# Patient Record
Sex: FEMALE | Race: OTHER | URBAN - METROPOLITAN AREA
[De-identification: names, ages, dates, MRNs, and addresses within clinical notes are randomized per-mention and may not be internally consistent; named-entity substitution may affect disease eponyms.]

---

## 2020-07-07 ENCOUNTER — OFFICE VISIT (OUTPATIENT)
Dept: URBAN - METROPOLITAN AREA TELEHEALTH 2 | Facility: TELEHEALTH | Age: 70
End: 2020-07-07

## 2020-08-13 ENCOUNTER — OFFICE VISIT (OUTPATIENT)
Dept: URBAN - METROPOLITAN AREA TELEHEALTH 2 | Facility: TELEHEALTH | Age: 70
End: 2020-08-13
Payer: MEDICARE

## 2020-08-13 ENCOUNTER — LAB OUTSIDE AN ENCOUNTER (OUTPATIENT)
Dept: URBAN - METROPOLITAN AREA TELEHEALTH 2 | Facility: TELEHEALTH | Age: 70
End: 2020-08-13

## 2020-08-13 DIAGNOSIS — R13.19 CERVICAL DYSPHAGIA: ICD-10-CM

## 2020-08-13 DIAGNOSIS — K22.70 BARRETT'S ESOPHAGUS WITHOUT DYSPLASIA: ICD-10-CM

## 2020-08-13 DIAGNOSIS — R05 COUGH: ICD-10-CM

## 2020-08-13 DIAGNOSIS — K21.9 GERD: ICD-10-CM

## 2020-08-13 DIAGNOSIS — Z12.11 COLON CANCER SCREENING: ICD-10-CM

## 2020-08-13 PROCEDURE — 1036F TOBACCO NON-USER: CPT | Performed by: INTERNAL MEDICINE

## 2020-08-13 PROCEDURE — G8427 DOCREV CUR MEDS BY ELIG CLIN: HCPCS | Performed by: INTERNAL MEDICINE

## 2020-08-13 PROCEDURE — 99213 OFFICE O/P EST LOW 20 MIN: CPT | Performed by: INTERNAL MEDICINE

## 2020-08-13 PROCEDURE — 3017F COLORECTAL CA SCREEN DOC REV: CPT | Performed by: INTERNAL MEDICINE

## 2020-08-13 PROCEDURE — G8420 CALC BMI NORM PARAMETERS: HCPCS | Performed by: INTERNAL MEDICINE

## 2020-08-13 PROCEDURE — G9903 PT SCRN TBCO ID AS NON USER: HCPCS | Performed by: INTERNAL MEDICINE

## 2020-08-13 RX ORDER — OMEPRAZOLE 40 MG/1
1 CAPSULE 30 MINUTES BEFORE MORNING MEAL CAPSULE, DELAYED RELEASE ORAL
Qty: 90 | Refills: 3 | OUTPATIENT
Start: 2020-08-13

## 2020-08-13 NOTE — HPI-TODAY'S VISIT:
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  She underwent endoscopy on 8/7/2017 which showed esophagitis and Schatzki's ring she was dilated to 48 [place] biopsies did show metaplasia mild gastritis and duodenitis was also found.  She underwent screening colonoscopy on 8/7/2017 which was within normal limits.  Overall patient has been feeling well, with no significant swallowing issues she does complain of coughing up phlegm on a regular basis.  She has been taking over-the-counter omeprazole on a as needed basis.

## 2020-08-17 ENCOUNTER — OFFICE VISIT (OUTPATIENT)
Dept: URBAN - METROPOLITAN AREA SURGERY CENTER 30 | Facility: SURGERY CENTER | Age: 70
End: 2020-08-17
Payer: MEDICARE

## 2020-08-17 DIAGNOSIS — K29.30 CHRONIC SUPERFICIAL GASTRITIS: ICD-10-CM

## 2020-08-17 DIAGNOSIS — K22.70 BARRETT ESOPHAGUS: ICD-10-CM

## 2020-08-17 DIAGNOSIS — K22.2 ACQUIRED ESOPHAGEAL RING: ICD-10-CM

## 2020-08-17 PROCEDURE — G8907 PT DOC NO EVENTS ON DISCHARG: HCPCS | Performed by: INTERNAL MEDICINE

## 2020-08-17 PROCEDURE — 43450 DILATE ESOPHAGUS 1/MULT PASS: CPT | Performed by: INTERNAL MEDICINE

## 2020-08-17 PROCEDURE — 43239 EGD BIOPSY SINGLE/MULTIPLE: CPT | Performed by: INTERNAL MEDICINE

## 2020-09-01 ENCOUNTER — OFFICE VISIT (OUTPATIENT)
Dept: URBAN - METROPOLITAN AREA TELEHEALTH 2 | Facility: TELEHEALTH | Age: 70
End: 2020-09-01
Payer: MEDICARE

## 2020-09-01 DIAGNOSIS — K21.9 GERD: ICD-10-CM

## 2020-09-01 DIAGNOSIS — R13.19 OTHER DYSPHAGIA: ICD-10-CM

## 2020-09-01 DIAGNOSIS — Z12.11 COLON CANCER SCREENING: ICD-10-CM

## 2020-09-01 DIAGNOSIS — R05 COUGH: ICD-10-CM

## 2020-09-01 DIAGNOSIS — K22.70 BARRETT'S ESOPHAGUS WITHOUT DYSPLASIA: ICD-10-CM

## 2020-09-01 PROCEDURE — 3017F COLORECTAL CA SCREEN DOC REV: CPT | Performed by: INTERNAL MEDICINE

## 2020-09-01 PROCEDURE — 1036F TOBACCO NON-USER: CPT | Performed by: INTERNAL MEDICINE

## 2020-09-01 PROCEDURE — 99213 OFFICE O/P EST LOW 20 MIN: CPT | Performed by: INTERNAL MEDICINE

## 2020-09-01 PROCEDURE — G8420 CALC BMI NORM PARAMETERS: HCPCS | Performed by: INTERNAL MEDICINE

## 2020-09-01 PROCEDURE — G8427 DOCREV CUR MEDS BY ELIG CLIN: HCPCS | Performed by: INTERNAL MEDICINE

## 2020-09-01 PROCEDURE — G9903 PT SCRN TBCO ID AS NON USER: HCPCS | Performed by: INTERNAL MEDICINE

## 2020-09-01 RX ORDER — OMEPRAZOLE 40 MG/1
1 CAPSULE 30 MINUTES BEFORE MORNING MEAL CAPSULE, DELAYED RELEASE ORAL
Qty: 90 | Refills: 3 | Status: ACTIVE | COMMUNITY
Start: 2020-08-13

## 2020-09-01 RX ORDER — OMEPRAZOLE 40 MG/1
1 CAPSULE 30 MINUTES BEFORE MORNING MEAL CAPSULE, DELAYED RELEASE ORAL
Qty: 90 | Refills: 3 | OUTPATIENT

## 2020-09-01 NOTE — HPI-TODAY'S VISIT:
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  She underwent endoscopy on 8/7/2017 which showed esophagitis and Schatzki's ring she was dilated to 48 [place] biopsies did show metaplasia mild gastritis and duodenitis was also found.  She underwent screening colonoscopy on 8/7/2017 which was within normal limits.  Overall patient has been feeling well, with no significant swallowing issues she does complain of coughing up phlegm on a regular basis.  She has been taking over-the-counter omeprazole on a as needed basis. Patient underwent EGD on 8/11/2020 the endoscopy revealed esophagitis and a Schatzki's ring, which was dilated to 50 [place] .  Mild gastritis was found biopsies did show Argueta's with metaplasia.  Overall patient states she has not seen any real improvement in her swallowing, but she has had recent cervical spinal surgery which required placement of an endotracheal tube.  She has not yet begun her 40 mg daily omeprazole as prescribed.

## 2022-07-11 ENCOUNTER — LAB OUTSIDE AN ENCOUNTER (OUTPATIENT)
Dept: URBAN - METROPOLITAN AREA CLINIC 40 | Facility: CLINIC | Age: 72
End: 2022-07-11

## 2022-07-11 ENCOUNTER — WEB ENCOUNTER (OUTPATIENT)
Dept: URBAN - METROPOLITAN AREA CLINIC 40 | Facility: CLINIC | Age: 72
End: 2022-07-11

## 2022-07-11 ENCOUNTER — OFFICE VISIT (OUTPATIENT)
Dept: URBAN - METROPOLITAN AREA CLINIC 40 | Facility: CLINIC | Age: 72
End: 2022-07-11
Payer: MEDICARE

## 2022-07-11 VITALS
DIASTOLIC BLOOD PRESSURE: 74 MMHG | TEMPERATURE: 98.5 F | HEART RATE: 78 BPM | HEIGHT: 66 IN | BODY MASS INDEX: 21.53 KG/M2 | WEIGHT: 134 LBS | SYSTOLIC BLOOD PRESSURE: 115 MMHG

## 2022-07-11 DIAGNOSIS — K21.9 GERD: ICD-10-CM

## 2022-07-11 DIAGNOSIS — R13.10 ESOPHAGEAL DYSPHAGIA: ICD-10-CM

## 2022-07-11 DIAGNOSIS — K22.70 BARRETT'S ESOPHAGUS WITHOUT DYSPLASIA: ICD-10-CM

## 2022-07-11 PROCEDURE — 99214 OFFICE O/P EST MOD 30 MIN: CPT | Performed by: INTERNAL MEDICINE

## 2022-07-11 RX ORDER — OMEPRAZOLE 40 MG/1
1 CAPSULE 30 MINUTES BEFORE MORNING MEAL CAPSULE, DELAYED RELEASE ORAL
Qty: 90 | Refills: 3 | Status: ACTIVE | COMMUNITY

## 2022-07-11 RX ORDER — OMEPRAZOLE 40 MG/1
1 CAPSULE 30 MINUTES BEFORE MORNING MEAL CAPSULE, DELAYED RELEASE ORAL
Qty: 90 | Refills: 3 | OUTPATIENT

## 2022-07-11 NOTE — HPI-TODAY'S VISIT:
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  She underwent endoscopy on 8/7/2017 which showed esophagitis and Schatzki's ring she was dilated to 48 [place] biopsies did show metaplasia mild gastritis and duodenitis was also found.  She underwent screening colonoscopy on 8/7/2017 which was within normal limits.  Overall patient has been feeling well, with no significant swallowing issues she does complain of coughing up phlegm on a regular basis.  She has been taking over-the-counter omeprazole on a as needed basis. Patient underwent EGD on 8/11/2020 the endoscopy revealed esophagitis and a Schatzki's ring, which was dilated to 50 [place] .  Mild gastritis was found biopsies did show Argueta's with metaplasia.  Overall patient states she has not seen any real improvement in her swallowing, but she has had recent cervical spinal surgery which required placement of an endotracheal tube.  She has not yet begun her 40 mg daily omeprazole as prescribed. Patient underwent a normal screening colonoscopy on 8/7/2017, with an excellent prep. Patient returns for follow-up on 7/11/2022.  Overall she has been doing well from a GI standpoint.  She takes that daily omeprazole which is helped her reflux symptoms.  She does complain of a chronic cough, which she believes may be reflux related.  She is having some dysphagia, and states that in the past esophageal dilation did temporarily help.  She denies any abdominal pain and her bowel movements have been normal, on probiotics.  We discussed her history of Argueta's, and agreed that we should go ahead with surveillance upper endoscopy, with esophageal dilation.

## 2022-07-28 ENCOUNTER — OFFICE VISIT (OUTPATIENT)
Dept: URBAN - METROPOLITAN AREA SURGERY CENTER 30 | Facility: SURGERY CENTER | Age: 72
End: 2022-07-28

## 2022-08-08 ENCOUNTER — OFFICE VISIT (OUTPATIENT)
Dept: URBAN - METROPOLITAN AREA SURGERY CENTER 30 | Facility: SURGERY CENTER | Age: 72
End: 2022-08-08

## 2022-08-11 ENCOUNTER — OFFICE VISIT (OUTPATIENT)
Dept: URBAN - METROPOLITAN AREA CLINIC 40 | Facility: CLINIC | Age: 72
End: 2022-08-11

## 2022-08-15 ENCOUNTER — OFFICE VISIT (OUTPATIENT)
Dept: URBAN - METROPOLITAN AREA SURGERY CENTER 30 | Facility: SURGERY CENTER | Age: 72
End: 2022-08-15

## 2022-08-15 ENCOUNTER — TELEPHONE ENCOUNTER (OUTPATIENT)
Dept: URBAN - METROPOLITAN AREA CLINIC 74 | Facility: CLINIC | Age: 72
End: 2022-08-15

## 2022-08-29 ENCOUNTER — OFFICE VISIT (OUTPATIENT)
Dept: URBAN - METROPOLITAN AREA SURGERY CENTER 30 | Facility: SURGERY CENTER | Age: 72
End: 2022-08-29

## 2022-09-01 ENCOUNTER — OFFICE VISIT (OUTPATIENT)
Dept: URBAN - METROPOLITAN AREA SURGERY CENTER 30 | Facility: SURGERY CENTER | Age: 72
End: 2022-09-01
Payer: MEDICARE

## 2022-09-01 ENCOUNTER — CLAIMS CREATED FROM THE CLAIM WINDOW (OUTPATIENT)
Dept: URBAN - METROPOLITAN AREA CLINIC 4 | Facility: CLINIC | Age: 72
End: 2022-09-01
Payer: MEDICARE

## 2022-09-01 DIAGNOSIS — K31.89 ACQUIRED DEFORMITY OF DUODENUM: ICD-10-CM

## 2022-09-01 DIAGNOSIS — K22.70 BARRETT'S ESOPHAGUS WITHOUT DYSPLASIA: ICD-10-CM

## 2022-09-01 DIAGNOSIS — K31.89 OTHER DISEASES OF STOMACH AND DUODENUM: ICD-10-CM

## 2022-09-01 DIAGNOSIS — K22.719 BARRETT'S ESOPHAGUS WITH DYSPLASIA, UNSPECIFIED: ICD-10-CM

## 2022-09-01 PROCEDURE — 88312 SPECIAL STAINS GROUP 1: CPT | Performed by: PATHOLOGY

## 2022-09-01 PROCEDURE — 88313 SPECIAL STAINS GROUP 2: CPT | Performed by: PATHOLOGY

## 2022-09-01 PROCEDURE — 88305 TISSUE EXAM BY PATHOLOGIST: CPT | Performed by: PATHOLOGY

## 2022-09-01 PROCEDURE — 43239 EGD BIOPSY SINGLE/MULTIPLE: CPT | Performed by: INTERNAL MEDICINE

## 2022-09-01 PROCEDURE — G8907 PT DOC NO EVENTS ON DISCHARG: HCPCS | Performed by: INTERNAL MEDICINE

## 2022-09-01 RX ORDER — OMEPRAZOLE 40 MG/1
1 CAPSULE 30 MINUTES BEFORE MORNING MEAL CAPSULE, DELAYED RELEASE ORAL
Qty: 90 | Refills: 3 | OUTPATIENT

## 2022-09-27 ENCOUNTER — LAB OUTSIDE AN ENCOUNTER (OUTPATIENT)
Dept: URBAN - METROPOLITAN AREA CLINIC 40 | Facility: CLINIC | Age: 72
End: 2022-09-27

## 2022-09-27 ENCOUNTER — OFFICE VISIT (OUTPATIENT)
Dept: URBAN - METROPOLITAN AREA CLINIC 40 | Facility: CLINIC | Age: 72
End: 2022-09-27
Payer: MEDICARE

## 2022-09-27 VITALS
HEART RATE: 63 BPM | SYSTOLIC BLOOD PRESSURE: 118 MMHG | BODY MASS INDEX: 22.14 KG/M2 | DIASTOLIC BLOOD PRESSURE: 62 MMHG | TEMPERATURE: 97.7 F | WEIGHT: 137.8 LBS | OXYGEN SATURATION: 98 % | HEIGHT: 66 IN

## 2022-09-27 DIAGNOSIS — R10.30 LOWER ABDOMINAL PAIN: ICD-10-CM

## 2022-09-27 DIAGNOSIS — K22.70 BARRETT'S ESOPHAGUS WITHOUT DYSPLASIA: ICD-10-CM

## 2022-09-27 DIAGNOSIS — R93.89 ABNORMAL CT SCAN: ICD-10-CM

## 2022-09-27 DIAGNOSIS — K21.9 GERD: ICD-10-CM

## 2022-09-27 DIAGNOSIS — R19.7 DIARRHEA, UNSPECIFIED TYPE: ICD-10-CM

## 2022-09-27 DIAGNOSIS — R13.10 ESOPHAGEAL DYSPHAGIA: ICD-10-CM

## 2022-09-27 PROCEDURE — 99214 OFFICE O/P EST MOD 30 MIN: CPT | Performed by: INTERNAL MEDICINE

## 2022-09-27 RX ORDER — OMEPRAZOLE 40 MG/1
1 CAPSULE 30 MINUTES BEFORE MORNING MEAL CAPSULE, DELAYED RELEASE ORAL
Qty: 90 | Refills: 3 | OUTPATIENT

## 2022-09-27 RX ORDER — OMEPRAZOLE 40 MG/1
1 CAPSULE 30 MINUTES BEFORE MORNING MEAL CAPSULE, DELAYED RELEASE ORAL
Qty: 90 | Refills: 3 | Status: DISCONTINUED | COMMUNITY

## 2022-09-27 NOTE — HPI-TODAY'S VISIT:
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  She underwent endoscopy on 8/7/2017 which showed esophagitis and Schatzki's ring she was dilated to 48 [place] biopsies did show metaplasia mild gastritis and duodenitis was also found.  She underwent screening colonoscopy on 8/7/2017 which was within normal limits.  Overall patient has been feeling well, with no significant swallowing issues she does complain of coughing up phlegm on a regular basis.  She has been taking over-the-counter omeprazole on a as needed basis. Patient underwent EGD on 8/11/2020 the endoscopy revealed esophagitis and a Schatzki's ring, which was dilated to 50 [place] .  Mild gastritis was found biopsies did show Argueta's with metaplasia.  Overall patient states she has not seen any real improvement in her swallowing, but she has had recent cervical spinal surgery which required placement of an endotracheal tube.  She has not yet begun her 40 mg daily omeprazole as prescribed. Patient underwent a normal screening colonoscopy on 8/7/2017, with an excellent prep. Patient returns for follow-up on 7/11/2022.  Overall she has been doing well from a GI standpoint.  She takes that daily omeprazole which is helped her reflux symptoms.  She does complain of a chronic cough, which she believes may be reflux related.  She is having some dysphagia, and states that in the past esophageal dilation did temporarily help.  She denies any abdominal pain and her bowel movements have been normal, on probiotics.  We discussed her history of Argueta's, and agreed that we should go ahead with surveillance upper endoscopy, with esophageal dilation. Patient underwent EGD on 9/1/2022 there was an irregular Z-line which was biopsied and patchy mild gastritis was also seen, and with biopsy.  Gastric biopsies were unremarkable, distal esophageal biopsies did show focal metaplasia consistent with Argueta's esophagus. Patient underwent chest abdominal and pelvic CT scan on 8/2/2022 which showed possible mild inflammatory stranding along the colon consistent with a possible mild colitis. Patient returns for follow-up on 9/27/2022.  She has done well since her upper endoscopy, and has been taking natural supplements to treat her occasional heartburn and reflux.  She is having no swallowing issues.  I reviewed the results of her endoscopy which includes likely Argueta's esophagus with metaplasia.  Also mild gastritis was seen.  She is having no swallowing issues.  She denies any significant heartburn or reflux at this time, and no epigastric pain.  I reviewed the chest abdomen and pelvic CT scan done 8-22 which showed possible inflammatory stranding along the colon consistent with a possible mild colitis.  Patient does state that she will have intermittent episodes of significant lower abdominal pain accompanied by diarrhea.  She denies any overt GI bleeding but has had significant weight loss, about 25 pounds over this past year.  We therefore agreed to go ahead with a diagnostic colonoscopy, to rule out any significant colitis, including ischemic colitis.

## 2022-10-17 ENCOUNTER — OFFICE VISIT (OUTPATIENT)
Dept: URBAN - METROPOLITAN AREA SURGERY CENTER 30 | Facility: SURGERY CENTER | Age: 72
End: 2022-10-17

## 2022-10-20 ENCOUNTER — OFFICE VISIT (OUTPATIENT)
Dept: URBAN - METROPOLITAN AREA SURGERY CENTER 30 | Facility: SURGERY CENTER | Age: 72
End: 2022-10-20
Payer: MEDICARE

## 2022-10-20 DIAGNOSIS — R93.3 ABN FINDINGS-GI TRACT: ICD-10-CM

## 2022-10-20 DIAGNOSIS — R10.30 ABDOMINAL PAIN OF UNKNOWN CAUSE: ICD-10-CM

## 2022-10-20 PROCEDURE — G8907 PT DOC NO EVENTS ON DISCHARG: HCPCS | Performed by: INTERNAL MEDICINE

## 2022-10-20 PROCEDURE — 45378 DIAGNOSTIC COLONOSCOPY: CPT | Performed by: INTERNAL MEDICINE

## 2022-10-20 RX ORDER — OMEPRAZOLE 40 MG/1
1 CAPSULE 30 MINUTES BEFORE MORNING MEAL CAPSULE, DELAYED RELEASE ORAL
Qty: 90 | Refills: 3 | OUTPATIENT

## 2022-10-20 RX ORDER — OMEPRAZOLE 40 MG/1
1 CAPSULE 30 MINUTES BEFORE MORNING MEAL CAPSULE, DELAYED RELEASE ORAL
Qty: 90 | Refills: 3 | Status: ACTIVE | COMMUNITY

## 2022-11-15 ENCOUNTER — OFFICE VISIT (OUTPATIENT)
Dept: URBAN - METROPOLITAN AREA CLINIC 40 | Facility: CLINIC | Age: 72
End: 2022-11-15

## 2022-11-15 RX ORDER — OMEPRAZOLE 40 MG/1
1 CAPSULE 30 MINUTES BEFORE MORNING MEAL CAPSULE, DELAYED RELEASE ORAL
Qty: 90 | Refills: 3 | OUTPATIENT

## 2022-11-15 NOTE — HPI-TODAY'S VISIT:
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  She underwent endoscopy on 8/7/2017 which showed esophagitis and Schatzki's ring she was dilated to 48 [place] biopsies did show metaplasia mild gastritis and duodenitis was also found.  She underwent screening colonoscopy on 8/7/2017 which was within normal limits.  Overall patient has been feeling well, with no significant swallowing issues she does complain of coughing up phlegm on a regular basis.  She has been taking over-the-counter omeprazole on a as needed basis. Patient underwent EGD on 8/11/2020 the endoscopy revealed esophagitis and a Schatzki's ring, which was dilated to 50 [place] .  Mild gastritis was found biopsies did show Argueta's with metaplasia.  Overall patient states she has not seen any real improvement in her swallowing, but she has had recent cervical spinal surgery which required placement of an endotracheal tube.  She has not yet begun her 40 mg daily omeprazole as prescribed. Patient underwent a normal screening colonoscopy on 8/7/2017, with an excellent prep. Patient returns for follow-up on 7/11/2022.  Overall she has been doing well from a GI standpoint.  She takes that daily omeprazole which is helped her reflux symptoms.  She does complain of a chronic cough, which she believes may be reflux related.  She is having some dysphagia, and states that in the past esophageal dilation did temporarily help.  She denies any abdominal pain and her bowel movements have been normal, on probiotics.  We discussed her history of Argueta's, and agreed that we should go ahead with surveillance upper endoscopy, with esophageal dilation. Patient underwent EGD on 9/1/2022 there was an irregular Z-line which was biopsied and patchy mild gastritis was also seen, and with biopsy.  Gastric biopsies were unremarkable, distal esophageal biopsies did show focal metaplasia consistent with Argueta's esophagus. Patient underwent chest abdominal and pelvic CT scan on 8/2/2022 which showed possible mild inflammatory stranding along the colon consistent with a possible mild colitis. Patient returns for follow-up on 9/27/2022.  She has done well since her upper endoscopy, and has been taking natural supplements to treat her occasional heartburn and reflux.  She is having no swallowing issues.  I reviewed the results of her endoscopy which includes likely Argueta's esophagus with metaplasia.  Also mild gastritis was seen.  She is having no swallowing issues.  She denies any significant heartburn or reflux at this time, and no epigastric pain.  I reviewed the chest abdomen and pelvic CT scan done 8-22 which showed possible inflammatory stranding along the colon consistent with a possible mild colitis.  Patient does state that she will have intermittent episodes of significant lower abdominal pain accompanied by diarrhea.  She denies any overt GI bleeding but has had significant weight loss, about 25 pounds over this past year.  We therefore agreed to go ahead with a diagnostic colonoscopy, to rule out any significant colitis, including ischemic colitis. Patient underwent her diagnostic colonoscopy on 10/20/2022.  Scattered sigmoid diverticulosis was seen, the exam was otherwise unremarkable, and the bowel prep was excellent.

## 2022-11-29 ENCOUNTER — OFFICE VISIT (OUTPATIENT)
Dept: URBAN - METROPOLITAN AREA TELEHEALTH 2 | Facility: TELEHEALTH | Age: 72
End: 2022-11-29
Payer: MEDICARE

## 2022-11-29 ENCOUNTER — CLAIMS CREATED FROM THE CLAIM WINDOW (OUTPATIENT)
Dept: URBAN - METROPOLITAN AREA CLINIC 40 | Facility: CLINIC | Age: 72
End: 2022-11-29
Payer: MEDICARE

## 2022-11-29 VITALS — BODY MASS INDEX: 21.53 KG/M2 | WEIGHT: 134 LBS | HEIGHT: 66 IN

## 2022-11-29 DIAGNOSIS — R13.10 ESOPHAGEAL DYSPHAGIA: ICD-10-CM

## 2022-11-29 DIAGNOSIS — K21.9 GERD: ICD-10-CM

## 2022-11-29 DIAGNOSIS — R10.30 LOWER ABDOMINAL PAIN: ICD-10-CM

## 2022-11-29 DIAGNOSIS — K22.70 BARRETT'S ESOPHAGUS WITHOUT DYSPLASIA: ICD-10-CM

## 2022-11-29 DIAGNOSIS — K57.90 DIVERTICULOSIS: ICD-10-CM

## 2022-11-29 DIAGNOSIS — R93.89 ABNORMAL CT SCAN: ICD-10-CM

## 2022-11-29 DIAGNOSIS — R19.7 DIARRHEA, UNSPECIFIED TYPE: ICD-10-CM

## 2022-11-29 PROBLEM — 40890009 ESOPHAGEAL DYSPHAGIA: Status: ACTIVE | Noted: 2022-07-09

## 2022-11-29 PROBLEM — 397881000: Status: ACTIVE | Noted: 2022-11-29

## 2022-11-29 PROBLEM — 129679001: Status: ACTIVE | Noted: 2022-09-27

## 2022-11-29 PROBLEM — 302914006: Status: ACTIVE | Noted: 2020-08-13

## 2022-11-29 PROCEDURE — 99213 OFFICE O/P EST LOW 20 MIN: CPT | Performed by: INTERNAL MEDICINE

## 2022-11-29 RX ORDER — OMEPRAZOLE 40 MG/1
1 CAPSULE 30 MINUTES BEFORE MORNING MEAL CAPSULE, DELAYED RELEASE ORAL
Qty: 90 | Refills: 3 | OUTPATIENT

## 2022-11-29 RX ORDER — OMEPRAZOLE 40 MG/1
1 CAPSULE 30 MINUTES BEFORE MORNING MEAL CAPSULE, DELAYED RELEASE ORAL
Qty: 90 | Refills: 3 | Status: ACTIVE | COMMUNITY

## 2022-11-29 NOTE — HPI-TODAY'S VISIT:
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  She underwent endoscopy on 8/7/2017 which showed esophagitis and Schatzki's ring she was dilated to 48 [place] biopsies did show metaplasia mild gastritis and duodenitis was also found.  She underwent screening colonoscopy on 8/7/2017 which was within normal limits.  Overall patient has been feeling well, with no significant swallowing issues she does complain of coughing up phlegm on a regular basis.  She has been taking over-the-counter omeprazole on a as needed basis. Patient underwent EGD on 8/11/2020 the endoscopy revealed esophagitis and a Schatzki's ring, which was dilated to 50 [place] .  Mild gastritis was found biopsies did show Argueta's with metaplasia.  Overall patient states she has not seen any real improvement in her swallowing, but she has had recent cervical spinal surgery which required placement of an endotracheal tube.  She has not yet begun her 40 mg daily omeprazole as prescribed. Patient underwent a normal screening colonoscopy on 8/7/2017, with an excellent prep. Patient returns for follow-up on 7/11/2022.  Overall she has been doing well from a GI standpoint.  She takes that daily omeprazole which is helped her reflux symptoms.  She does complain of a chronic cough, which she believes may be reflux related.  She is having some dysphagia, and states that in the past esophageal dilation did temporarily help.  She denies any abdominal pain and her bowel movements have been normal, on probiotics.  We discussed her history of Argueta's, and agreed that we should go ahead with surveillance upper endoscopy, with esophageal dilation. Patient underwent EGD on 9/1/2022 there was an irregular Z-line which was biopsied and patchy mild gastritis was also seen, and with biopsy.  Gastric biopsies were unremarkable, distal esophageal biopsies did show focal metaplasia consistent with Argueta's esophagus. Patient underwent chest abdominal and pelvic CT scan on 8/2/2022 which showed possible mild inflammatory stranding along the colon consistent with a possible mild colitis. Patient returns for follow-up on 9/27/2022.  She has done well since her upper endoscopy, and has been taking natural supplements to treat her occasional heartburn and reflux.  She is having no swallowing issues.  I reviewed the results of her endoscopy which includes likely Argueta's esophagus with metaplasia.  Also mild gastritis was seen.  She is having no swallowing issues.  She denies any significant heartburn or reflux at this time, and no epigastric pain.  I reviewed the chest abdomen and pelvic CT scan done 8-22 which showed possible inflammatory stranding along the colon consistent with a possible mild colitis.  Patient does state that she will have intermittent episodes of significant lower abdominal pain accompanied by diarrhea.  She denies any overt GI bleeding but has had significant weight loss, about 25 pounds over this past year.  We therefore agreed to go ahead with a diagnostic colonoscopy, to rule out any significant colitis, including ischemic colitis. Patient underwent her diagnostic colonoscopy on 10/20/2022.  Scattered sigmoid diverticulosis was seen, the exam was otherwise unremarkable, and the bowel prep was excellent. Patient returns for follow-up on 11/29/2022.  Overall she is feeling well, and having only occasional mild episodes of left lower quadrant discomfort.  She will have only occasional mild episodes of reflux, and has not required omeprazole therapy.  She has no swallowing issues.  I reviewed in detail the results of her recent colonoscopy showing only scattered sigmoid diverticulosis.  She is eating well with a good appetite, having no significant weight loss.

## 2022-12-06 ENCOUNTER — TELEPHONE ENCOUNTER (OUTPATIENT)
Dept: URBAN - METROPOLITAN AREA CLINIC 63 | Facility: CLINIC | Age: 72
End: 2022-12-06

## 2023-09-06 ENCOUNTER — TELEPHONE ENCOUNTER (OUTPATIENT)
Dept: URBAN - METROPOLITAN AREA CLINIC 63 | Facility: CLINIC | Age: 73
End: 2023-09-06

## 2024-05-01 ENCOUNTER — OFFICE VISIT (OUTPATIENT)
Dept: URBAN - METROPOLITAN AREA CLINIC 40 | Facility: CLINIC | Age: 74
End: 2024-05-01

## 2024-05-01 RX ORDER — OMEPRAZOLE 40 MG/1
1 CAPSULE 30 MINUTES BEFORE MORNING MEAL CAPSULE, DELAYED RELEASE ORAL
Qty: 90 | Refills: 3 | Status: ACTIVE | COMMUNITY

## 2024-05-03 ENCOUNTER — OFFICE VISIT (OUTPATIENT)
Dept: URBAN - METROPOLITAN AREA CLINIC 40 | Facility: CLINIC | Age: 74
End: 2024-05-03
Payer: COMMERCIAL

## 2024-05-03 ENCOUNTER — DASHBOARD ENCOUNTERS (OUTPATIENT)
Age: 74
End: 2024-05-03

## 2024-05-03 ENCOUNTER — LAB OUTSIDE AN ENCOUNTER (OUTPATIENT)
Dept: URBAN - METROPOLITAN AREA CLINIC 40 | Facility: CLINIC | Age: 74
End: 2024-05-03

## 2024-05-03 VITALS
TEMPERATURE: 97.7 F | WEIGHT: 128 LBS | HEIGHT: 66 IN | DIASTOLIC BLOOD PRESSURE: 78 MMHG | HEART RATE: 74 BPM | SYSTOLIC BLOOD PRESSURE: 124 MMHG | BODY MASS INDEX: 20.57 KG/M2

## 2024-05-03 DIAGNOSIS — R13.10 ESOPHAGEAL DYSPHAGIA: ICD-10-CM

## 2024-05-03 DIAGNOSIS — K22.70 BARRETT'S ESOPHAGUS WITHOUT DYSPLASIA: ICD-10-CM

## 2024-05-03 DIAGNOSIS — K21.9 GERD: ICD-10-CM

## 2024-05-03 DIAGNOSIS — R12 HEARTBURN: ICD-10-CM

## 2024-05-03 PROCEDURE — 99213 OFFICE O/P EST LOW 20 MIN: CPT | Performed by: NURSE PRACTITIONER

## 2024-05-03 RX ORDER — OMEPRAZOLE 40 MG/1
1 CAPSULE 30 MINUTES BEFORE MORNING MEAL CAPSULE, DELAYED RELEASE ORAL
Qty: 90 | Refills: 3 | Status: ACTIVE | COMMUNITY

## 2024-05-03 RX ORDER — SUCRALFATE 1 G/1
1 TABLET ON AN EMPTY STOMACH TABLET ORAL TWICE A DAY
Qty: 60 | Refills: 1 | OUTPATIENT
Start: 2024-05-03 | End: 2024-07-02

## 2024-05-13 ENCOUNTER — OFFICE VISIT (OUTPATIENT)
Dept: URBAN - METROPOLITAN AREA SURGERY CENTER 30 | Facility: SURGERY CENTER | Age: 74
End: 2024-05-13

## 2024-05-23 ENCOUNTER — TELEPHONE ENCOUNTER (OUTPATIENT)
Dept: URBAN - METROPOLITAN AREA CLINIC 40 | Facility: CLINIC | Age: 74
End: 2024-05-23

## 2024-05-28 ENCOUNTER — OUT OF OFFICE VISIT (OUTPATIENT)
Dept: URBAN - METROPOLITAN AREA SURGERY CENTER 30 | Facility: SURGERY CENTER | Age: 74
End: 2024-05-28

## 2024-05-28 ENCOUNTER — CLAIMS CREATED FROM THE CLAIM WINDOW (OUTPATIENT)
Dept: URBAN - METROPOLITAN AREA CLINIC 4 | Facility: CLINIC | Age: 74
End: 2024-05-28
Payer: COMMERCIAL

## 2024-05-28 DIAGNOSIS — K22.719 BARRETT'S ESOPHAGUS WITH DYSPLASIA, UNSPECIFIED: ICD-10-CM

## 2024-05-28 DIAGNOSIS — K31.A0 GASTRIC INTESTINAL METAPLASIA, UNSPECIFIED: ICD-10-CM

## 2024-05-28 PROCEDURE — 88313 SPECIAL STAINS GROUP 2: CPT | Performed by: PATHOLOGY

## 2024-05-28 PROCEDURE — 88342 IMHCHEM/IMCYTCHM 1ST ANTB: CPT | Performed by: PATHOLOGY

## 2024-05-28 PROCEDURE — 88305 TISSUE EXAM BY PATHOLOGIST: CPT | Performed by: PATHOLOGY

## 2024-05-28 RX ORDER — SUCRALFATE 1 G/1
1 TABLET ON AN EMPTY STOMACH TABLET ORAL TWICE A DAY
Qty: 60 | Refills: 1 | Status: ACTIVE | COMMUNITY
Start: 2024-05-03 | End: 2024-07-02

## 2024-05-28 RX ORDER — SUCRALFATE 1 G/1
1 TABLET ON AN EMPTY STOMACH TABLET ORAL TWICE A DAY
Qty: 60 | Refills: 1 | OUTPATIENT

## 2024-05-28 RX ORDER — OMEPRAZOLE 40 MG/1
1 CAPSULE 30 MINUTES BEFORE MORNING MEAL CAPSULE, DELAYED RELEASE ORAL
Qty: 90 | Refills: 3 | Status: ACTIVE | COMMUNITY

## 2024-06-12 ENCOUNTER — OFFICE VISIT (OUTPATIENT)
Dept: URBAN - METROPOLITAN AREA CLINIC 40 | Facility: CLINIC | Age: 74
End: 2024-06-12

## 2024-07-02 ENCOUNTER — OFFICE VISIT (OUTPATIENT)
Dept: URBAN - METROPOLITAN AREA CLINIC 40 | Facility: CLINIC | Age: 74
End: 2024-07-02

## 2024-07-02 RX ORDER — OMEPRAZOLE 40 MG/1
1 CAPSULE 30 MINUTES BEFORE MORNING MEAL CAPSULE, DELAYED RELEASE ORAL
Qty: 90 | Refills: 3 | Status: ACTIVE | COMMUNITY

## 2024-07-02 RX ORDER — SUCRALFATE 1 G/1
1 TABLET ON AN EMPTY STOMACH TABLET ORAL TWICE A DAY
Qty: 60 | Refills: 1 | Status: ACTIVE | COMMUNITY

## 2024-07-02 RX ORDER — SUCRALFATE 1 G/1
1 TABLET ON AN EMPTY STOMACH TABLET ORAL TWICE A DAY
Qty: 60 | Refills: 1 | OUTPATIENT

## 2024-07-02 NOTE — HPI-TODAY'S VISIT:
73-year-old female patient with past medical history as listed below, known to Dr. Meadows.  Last seen November 29, 2022 Long history of GERD, Argueta's esophagus had colonoscopy October 20, 2022 with diverticulosis. Mild episodes of reflux not required omeprazole therapy. Denies swallowing issues. Plan was EGD to survey Argueta's in about 2 years.    -- 04/29/24: [facility]: Female with significant past medical history of Argueta esophagus, scleroderma, tobacco use disorder and hypothyroidism who was admitted for atypical chest pain worse in prone positioning. Initial approach showed elevated troponins without significant 2hr delta, echocardiogram and lexiscan without abnormalities. Patient is hemodynamically stable and nearly asymptomatic. Has called GI for a close follow-up appointment on 5/1/2024. Patient is discharged. #Atypical non-cardiac chest pain, likely secondary to GERD in the setting of Argueta esophagus and scleroderma Elevated troponins without significant 2hr delta No ST/T abnormalities in EKG Echocardiogram: EF: 65%, mild TRLexiscan is negative for ischemia Discharged with Protonix BID,Will have a GI follow up tomorrow (5/1/2024)Encouraged to have an outpatient follow up with PCP and rheumatology #Persistent cough Diagnosed with scleroderma (follows with Dr. Alatorre)Finished Levaquin (2 weeks) for previous PNACT, cepheid, procalcitonin unremarkable Follow up with PCP as an outpatient. Suspected symptoms GI related.   -- The patient presents today for hospital follow-up.  She is also due for surveillance EGD for Argueta's esophagus.  Has complaints of dysphagia to solids, heartburn.  She states she stopped smoking after this hospital encounter.  Complains of every morning having tan copious phlegm.  Discussed that this certainly could be from history of smoking.  States her dysphagia to solids is not as bad as it has been in the past but still is occurring. She was started on pantoprazole twice a day after the hospital visit, we will continue this.  Discussed with her we will add sucralfate slurry for heartburn symptoms. She has had multiple sinus infections with antibiotics twice this year,once in January and once recently.  States Dr. Reyes told her to stop her current antibiotics,she had goes back and forth between diarrhea and constipation, discussed not super suspicious of bacterial infection considering the it alternates between diarrhea and constipationand she has been told that she has IBS. Advise she also follow up with pulmonology to evaluate chronic cough and phlegm. She also c/o unintentional weight loss. 73-year-old female patient with past medical history as listed below, known to Dr. Meadows.  Last seen November 29, 2022 Long history of GERD, Argueta's esophagus had colonoscopy October 20, 2022 with diverticulosis. Mild episodes of reflux not required omeprazole therapy. Denies swallowing issues. Plan was EGD to survey Argueta's in about 2 years.    -- 04/29/24: [facility]: Female with significant past medical history of Argueta esophagus, scleroderma, tobacco use disorder and hypothyroidism who was admitted for atypical chest pain worse in prone positioning. Initial approach showed elevated troponins without significant 2hr delta, echocardiogram and lexiscan without abnormalities. Patient is hemodynamically stable and nearly asymptomatic. Has called GI for a close follow-up appointment on 5/1/2024. Patient is discharged. #Atypical non-cardiac chest pain, likely secondary to GERD in the setting of Argueta esophagus and scleroderma Elevated troponins without significant 2hr delta No ST/T abnormalities in EKG Echocardiogram: EF: 65%, mild TRLexiscan is negative for ischemia Discharged with Protonix BID,Will have a GI follow up tomorrow (5/1/2024)Encouraged to have an outpatient follow up with PCP and rheumatology #Persistent cough Diagnosed with scleroderma (follows with Dr. Alatorre)Finished Levaquin (2 weeks) for previous PNACT, cepheid, procalcitonin unremarkable Follow up with PCP as an outpatient. Suspected symptoms GI related.   -- The patient presents today for hospital follow-up.  She is also due for surveillance EGD for Argueta's esophagus.  Has complaints of dysphagia to solids, heartburn.  She states she stopped smoking after this hospital encounter.  Complains of every morning having tan copious phlegm.  Discussed that this certainly could be from history of smoking.  States her dysphagia to solids is not as bad as it has been in the past but still is occurring. She was started on pantoprazole twice a day after the hospital visit, we will continue this.  Discussed with her we will add sucralfate slurry for heartburn symptoms. She has had multiple sinus infections with antibiotics twice this year,once in January and once recently.  States Dr. Reyes told her to stop her current antibiotics,she had goes back and forth between diarrhea and constipation, discussed not super suspicious of bacterial infection considering the it alternates between diarrhea and constipationand she has been told that she has IBS. Advise she also follow up with pulmonology to evaluate chronic cough and phlegm. She also c/o unintentional weight loss. Patient underwent EGD with esophageal dilation on 5/28/2024.  There was an irregular Z-line which was biopsied small hiatal hernia was present the esophagus was dilated with Ruiz dilators 48 and 52 [place] with mild resistance and patchy mild gastritis was seen which was biopsied.  Gastric biopsies revealed benign inflammation with changes suggestive of treated H. pylori gastritis esophageal biopsies were consistent with Argueta's with metaplasia. Patient underwent abdominal pelvic CT scan on 6/26/2024 which showed diverticulosis and a large amount of colonic fecal waste, otherwise no acute findings.

## 2024-07-30 ENCOUNTER — OFFICE VISIT (OUTPATIENT)
Dept: URBAN - METROPOLITAN AREA CLINIC 40 | Facility: CLINIC | Age: 74
End: 2024-07-30

## 2025-08-07 ENCOUNTER — LAB OUTSIDE AN ENCOUNTER (OUTPATIENT)
Dept: URBAN - METROPOLITAN AREA CLINIC 40 | Facility: CLINIC | Age: 75
End: 2025-08-07

## 2025-08-07 ENCOUNTER — OFFICE VISIT (OUTPATIENT)
Dept: URBAN - METROPOLITAN AREA CLINIC 40 | Facility: CLINIC | Age: 75
End: 2025-08-07
Payer: COMMERCIAL

## 2025-08-07 DIAGNOSIS — K22.70 BARRETT'S ESOPHAGUS WITHOUT DYSPLASIA: ICD-10-CM

## 2025-08-07 DIAGNOSIS — R09.89 CHOKING SENSATION: ICD-10-CM

## 2025-08-07 DIAGNOSIS — Z72.0 TOBACCO USE: ICD-10-CM

## 2025-08-07 DIAGNOSIS — K21.9 GERD: ICD-10-CM

## 2025-08-07 DIAGNOSIS — R19.7 CHRONIC DIARRHEA: ICD-10-CM

## 2025-08-07 PROCEDURE — 99214 OFFICE O/P EST MOD 30 MIN: CPT | Performed by: PHYSICIAN ASSISTANT

## 2025-08-07 RX ORDER — LEVOTHYROXINE SODIUM 150 UG/1
1 TABLET IN THE MORNING ON AN EMPTY STOMACH TABLET ORAL ONCE A DAY
Status: ACTIVE | COMMUNITY